# Patient Record
Sex: MALE | Race: WHITE | NOT HISPANIC OR LATINO | Employment: FULL TIME | URBAN - METROPOLITAN AREA
[De-identification: names, ages, dates, MRNs, and addresses within clinical notes are randomized per-mention and may not be internally consistent; named-entity substitution may affect disease eponyms.]

---

## 2018-04-19 ENCOUNTER — HOSPITAL ENCOUNTER (EMERGENCY)
Facility: HOSPITAL | Age: 43
Discharge: HOME | End: 2018-04-19
Attending: EMERGENCY MEDICINE | Admitting: EMERGENCY MEDICINE

## 2018-04-19 VITALS
DIASTOLIC BLOOD PRESSURE: 76 MMHG | RESPIRATION RATE: 16 BRPM | SYSTOLIC BLOOD PRESSURE: 140 MMHG | HEART RATE: 61 BPM | BODY MASS INDEX: 26.73 KG/M2 | TEMPERATURE: 97.6 F | OXYGEN SATURATION: 98 % | WEIGHT: 215 LBS | HEIGHT: 75 IN

## 2018-04-19 DIAGNOSIS — B34.9 VIRAL SYNDROME: Primary | ICD-10-CM

## 2018-04-19 LAB
ALBUMIN SERPL-MCNC: 3.9 G/DL (ref 3.5–5.2)
ALBUMIN/GLOB SERPL: 1.1 G/DL
ALP SERPL-CCNC: 65 U/L (ref 39–117)
ALT SERPL W P-5'-P-CCNC: 14 U/L (ref 1–41)
ANION GAP SERPL CALCULATED.3IONS-SCNC: 10.3 MMOL/L
AST SERPL-CCNC: 16 U/L (ref 1–40)
BASOPHILS # BLD AUTO: 0.11 10*3/MM3 (ref 0–0.2)
BASOPHILS NFR BLD AUTO: 1.3 % (ref 0–1.5)
BILIRUB SERPL-MCNC: 0.4 MG/DL (ref 0.1–1.2)
BUN BLD-MCNC: 13 MG/DL (ref 6–20)
BUN/CREAT SERPL: 13.7 (ref 7–25)
CALCIUM SPEC-SCNC: 8.5 MG/DL (ref 8.6–10.5)
CHLORIDE SERPL-SCNC: 100 MMOL/L (ref 98–107)
CO2 SERPL-SCNC: 25.7 MMOL/L (ref 22–29)
CREAT BLD-MCNC: 0.95 MG/DL (ref 0.76–1.27)
DEPRECATED RDW RBC AUTO: 44 FL (ref 37–54)
EOSINOPHIL # BLD AUTO: 0.12 10*3/MM3 (ref 0–0.7)
EOSINOPHIL NFR BLD AUTO: 1.5 % (ref 0.3–6.2)
ERYTHROCYTE [DISTWIDTH] IN BLOOD BY AUTOMATED COUNT: 13 % (ref 11.5–14.5)
GFR SERPL CREATININE-BSD FRML MDRD: 87 ML/MIN/1.73
GLOBULIN UR ELPH-MCNC: 3.4 GM/DL
GLUCOSE BLD-MCNC: 95 MG/DL (ref 65–99)
HCT VFR BLD AUTO: 47.3 % (ref 40.4–52.2)
HETEROPH AB SER QL LA: NEGATIVE
HGB BLD-MCNC: 16 G/DL (ref 13.7–17.6)
IMM GRANULOCYTES # BLD: 0.04 10*3/MM3 (ref 0–0.03)
IMM GRANULOCYTES NFR BLD: 0.5 % (ref 0–0.5)
LYMPHOCYTES # BLD AUTO: 2 10*3/MM3 (ref 0.9–4.8)
LYMPHOCYTES NFR BLD AUTO: 24.3 % (ref 19.6–45.3)
MCH RBC QN AUTO: 31.6 PG (ref 27–32.7)
MCHC RBC AUTO-ENTMCNC: 33.8 G/DL (ref 32.6–36.4)
MCV RBC AUTO: 93.3 FL (ref 79.8–96.2)
MONOCYTES # BLD AUTO: 1.46 10*3/MM3 (ref 0.2–1.2)
MONOCYTES NFR BLD AUTO: 17.7 % (ref 5–12)
NEUTROPHILS # BLD AUTO: 4.51 10*3/MM3 (ref 1.9–8.1)
NEUTROPHILS NFR BLD AUTO: 54.7 % (ref 42.7–76)
PLATELET # BLD AUTO: 187 10*3/MM3 (ref 140–500)
PMV BLD AUTO: 9.9 FL (ref 6–12)
POTASSIUM BLD-SCNC: 4.1 MMOL/L (ref 3.5–5.2)
PROT SERPL-MCNC: 7.3 G/DL (ref 6–8.5)
RBC # BLD AUTO: 5.07 10*6/MM3 (ref 4.6–6)
S PYO AG THROAT QL: NEGATIVE
SODIUM BLD-SCNC: 136 MMOL/L (ref 136–145)
WBC NRBC COR # BLD: 8.24 10*3/MM3 (ref 4.5–10.7)

## 2018-04-19 PROCEDURE — 80053 COMPREHEN METABOLIC PANEL: CPT | Performed by: PHYSICIAN ASSISTANT

## 2018-04-19 PROCEDURE — 99283 EMERGENCY DEPT VISIT LOW MDM: CPT

## 2018-04-19 PROCEDURE — 87081 CULTURE SCREEN ONLY: CPT | Performed by: PHYSICIAN ASSISTANT

## 2018-04-19 PROCEDURE — 86308 HETEROPHILE ANTIBODY SCREEN: CPT | Performed by: PHYSICIAN ASSISTANT

## 2018-04-19 PROCEDURE — 85025 COMPLETE CBC W/AUTO DIFF WBC: CPT | Performed by: PHYSICIAN ASSISTANT

## 2018-04-19 PROCEDURE — 87147 CULTURE TYPE IMMUNOLOGIC: CPT | Performed by: PHYSICIAN ASSISTANT

## 2018-04-19 PROCEDURE — 87880 STREP A ASSAY W/OPTIC: CPT | Performed by: PHYSICIAN ASSISTANT

## 2018-04-19 NOTE — ED PROVIDER NOTES
MD ATTESTATION NOTE    The JUS and I have discussed this patient's history, physical exam, and treatment plan.  I have reviewed the documentation and personally had a face to face interaction with the patient. I affirm the documentation and agree with the treatment and plan.  The attached note describes my personal findings.    Pt presents to the ED c/o nausea and vomiting onset about 3-4 days ago. Pt has also had subjective fever, chills, sore throat, and generalized myalgias. Pt denies diarrhea, chest pain, dyspnea, headache, neck pain/stiffness, cough, and abdominal pain. On physical exam, pt is alert and oriented x3. Pt is nontoxic in appearance. No scleral icterus. There is no oropharyngeal edema or erythema. There is no LUQ or RUQ abdominal tenderness.  Labs normal. Will discharge home.           Documentation assistance provided by Tom Austin. Information recorded by the scribe was done at my direction and has been verified and validated by me.     Entered by Tom Austin, acting as scribe for Dr. Markos MD.        Tom Austin  04/19/18 4122       Karel Burrows MD  04/19/18 0629

## 2018-04-19 NOTE — ED PROVIDER NOTES
EMERGENCY DEPARTMENT ENCOUNTER    CHIEF COMPLAINT  Chief Complaint: Fever  History given by: Pt  History limited by: Nothing  Room Number: 13/13  PMD: No Known Provider      HPI:  Pt is a 42 y.o. male who presents complaining of fever (101) that began two days ago.  Pt reports associated vomiting and  sore throat.  Pt denies diarrhea and abdominal pain. He also denies contact with any known sick individuals.  Pt took Theraflu at home at 0400. He is concerned that he may have Hepatitis A.     Duration:  Two days  Onset: Gradual  Timing: Constant  Quality: 101  Intensity/Severity: Moderate  Progression: Unchanged  Associated Symptoms: Pt reports associated vomiting and  sore throat.   Aggravating Factors: None  Alleviating Factors: None  Previous Episodes: None  Treatment before arrival: Theraflu    PAST MEDICAL HISTORY  Active Ambulatory Problems     Diagnosis Date Noted   • No Active Ambulatory Problems     Resolved Ambulatory Problems     Diagnosis Date Noted   • No Resolved Ambulatory Problems     No Additional Past Medical History       PAST SURGICAL HISTORY  History reviewed. No pertinent surgical history.    FAMILY HISTORY  History reviewed. No pertinent family history.    SOCIAL HISTORY  Social History     Social History   • Marital status:      Spouse name: N/A   • Number of children: N/A   • Years of education: N/A     Occupational History   • Not on file.     Social History Main Topics   • Smoking status: Current Every Day Smoker   • Smokeless tobacco: Not on file   • Alcohol use Yes      Comment: rarely   • Drug use: Unknown   • Sexual activity: Not on file     Other Topics Concern   • Not on file     Social History Narrative   • No narrative on file       ALLERGIES  Review of patient's allergies indicates no known allergies.    REVIEW OF SYSTEMS  Review of Systems   Constitutional: Positive for fever (101). Negative for activity change and appetite change.   HENT: Positive for sore throat.  Negative for congestion.    Respiratory: Negative for cough and shortness of breath.    Cardiovascular: Negative for chest pain and leg swelling.   Gastrointestinal: Positive for vomiting. Negative for abdominal pain and diarrhea.   Genitourinary: Negative for decreased urine volume and dysuria.   Musculoskeletal: Negative for neck pain.   Skin: Negative for rash and wound.   Neurological: Negative for weakness, numbness and headaches.   Psychiatric/Behavioral: Negative.    All other systems reviewed and are negative.      PHYSICAL EXAM  ED Triage Vitals   Temp Heart Rate Resp BP SpO2   04/19/18 1006 04/19/18 1006 04/19/18 1006 04/19/18 1013 04/19/18 1006   97.6 °F (36.4 °C) 79 18 134/78 97 %      Temp src Heart Rate Source Patient Position BP Location FiO2 (%)   04/19/18 1016 -- -- -- --   Oral           Physical Exam   Constitutional: He is oriented to person, place, and time and well-developed, well-nourished, and in no distress.   HENT:   Head: Normocephalic and atraumatic.   Eyes: EOM are normal. Pupils are equal, round, and reactive to light. No scleral icterus.   Neck: Normal range of motion. Neck supple.   Cardiovascular: Normal rate, regular rhythm and normal heart sounds.    Pulmonary/Chest: Effort normal and breath sounds normal. No respiratory distress.   Abdominal: Soft. There is no tenderness. There is no rebound and no guarding.   Musculoskeletal: Normal range of motion. He exhibits no edema.   Neurological: He is alert and oriented to person, place, and time. He has normal sensation and normal strength.   Skin: Skin is warm and dry.   Psychiatric: Mood and affect normal.   Nursing note and vitals reviewed.      LAB RESULTS  Lab Results (last 24 hours)     Procedure Component Value Units Date/Time    CBC & Differential [[id number]] Collected:  04/19/18 1116    Specimen:  Blood Updated:  04/19/18 1128    Narrative:       The following orders were created for panel order CBC & Differential.  Procedure                                Abnormality         Status                     ---------                               -----------         ------                     CBC Auto Differential[[id number]]        Abnormal            Final result                 Please view results for these tests on the individual orders.    Comprehensive Metabolic Panel [[id number]]  (Abnormal) Collected:  04/19/18 1116    Specimen:  Blood Updated:  04/19/18 1150     Glucose 95 mg/dL      BUN 13 mg/dL      Creatinine 0.95 mg/dL      Sodium 136 mmol/L      Potassium 4.1 mmol/L      Chloride 100 mmol/L      CO2 25.7 mmol/L      Calcium 8.5 (L) mg/dL      Total Protein 7.3 g/dL      Albumin 3.90 g/dL      ALT (SGPT) 14 U/L      AST (SGOT) 16 U/L      Alkaline Phosphatase 65 U/L      Total Bilirubin 0.4 mg/dL      eGFR Non African Amer 87 mL/min/1.73      Globulin 3.4 gm/dL      A/G Ratio 1.1 g/dL      BUN/Creatinine Ratio 13.7     Anion Gap 10.3 mmol/L     Mononucleosis Screen [[id number]]  (Normal) Collected:  04/19/18 1116    Specimen:  Blood Updated:  04/19/18 1146     Monospot Negative    CBC Auto Differential [[id number]]  (Abnormal) Collected:  04/19/18 1116    Specimen:  Blood Updated:  04/19/18 1128     WBC 8.24 10*3/mm3      RBC 5.07 10*6/mm3      Hemoglobin 16.0 g/dL      Hematocrit 47.3 %      MCV 93.3 fL      MCH 31.6 pg      MCHC 33.8 g/dL      RDW 13.0 %      RDW-SD 44.0 fl      MPV 9.9 fL      Platelets 187 10*3/mm3      Neutrophil % 54.7 %      Lymphocyte % 24.3 %      Monocyte % 17.7 (H) %      Eosinophil % 1.5 %      Basophil % 1.3 %      Immature Grans % 0.5 %      Neutrophils, Absolute 4.51 10*3/mm3      Lymphocytes, Absolute 2.00 10*3/mm3      Monocytes, Absolute 1.46 (H) 10*3/mm3      Eosinophils, Absolute 0.12 10*3/mm3      Basophils, Absolute 0.11 10*3/mm3      Immature Grans, Absolute 0.04 (H) 10*3/mm3     Rapid Strep A Screen - Swab, Throat [[id number]]  (Normal) Collected:  04/19/18 1117    Specimen:  Swab from Throat  Updated:  04/19/18 1131     Strep A Ag Negative    Beta Strep Culture, Throat - Swab, Throat [[id number]] Collected:  04/19/18 1117    Specimen:  Swab from Throat Updated:  04/19/18 1130          I ordered the above labs and reviewed the results    PROCEDURES  Procedures      PROGRESS AND CONSULTS  ED Course   Medications - No data to display    1031  Discussed Pt history and care plan with Dr. Burrows.  He agrees with plan of care.     1205  Labs are normal.  Discussed these results with Pt. Discussed plan to discharge.  Pt understands and agrees with the plan, all questions answered.      MEDICAL DECISION MAKING  Results were reviewed/discussed with the patient and they were also made aware of online access. Pt also made aware that some labs, such as cultures, will not be resulted during ER visit and follow up with PMD is necessary.     MDM  Number of Diagnoses or Management Options  Viral syndrome:   Diagnosis management comments: Nontoxic appearing.  No evidence of sepsis.         Amount and/or Complexity of Data Reviewed  Clinical lab tests: reviewed (Labs show nothing acute. )    Patient Progress  Patient progress: stable         DIAGNOSIS  Final diagnoses:   Viral syndrome       DISPOSITION  DISCHARGE    Patient discharged in stable condition.    Reviewed implications of results, diagnosis, meds, responsibility to follow up, warning signs and symptoms of possible worsening, potential complications and reasons to return to ER.    Patient/Family voiced understanding of above instructions.    Discussed plan for discharge, as there is no emergent indication for admission. Patient referred to primary care provider for BP management due to today's BP. Pt/family is agreeable and understands need for follow up and repeat testing.  Pt is aware that discharge does not mean that nothing is wrong but it indicates no emergency is present that requires admission and they must continue care with follow-up as given below or  physician of their choice.     FOLLOW-UP  PATIENT LIAISON [facility] [id number]  [phone]  Schedule an appointment as soon as possible for a visit   As needed         Medication List      No changes were made to your prescriptions during this visit.           Latest Documented Vital Signs:  As of 12:08 PM  BP- 120/73 HR- 69 Temp- 97.6 °F (36.4 °C) (Oral) O2 sat- 97%    --  Documentation assistance provided by michele Bailey for GAIL Byrd.  Information recorded by the scribe was done at my direction and has been verified and validated by me.        Marilee Bailey  04/19/18 1210       FRANCINE Cotton  04/19/18 1612

## 2018-04-19 NOTE — ED TRIAGE NOTES
"Pt complains of fever x 48 hrs (max 101), cold chills, sore throat, body aches  \" wanna make sure I don't have that hepatitis A that's going around\"  "

## 2018-04-21 LAB
BACTERIA SPEC AEROBE CULT: ABNORMAL
BACTERIA SPEC AEROBE CULT: ABNORMAL
STREP GROUPING: ABNORMAL

## 2020-07-13 ENCOUNTER — OFFICE VISIT (OUTPATIENT)
Dept: FAMILY MEDICINE CLINIC | Facility: CLINIC | Age: 45
End: 2020-07-13

## 2020-07-13 VITALS
HEIGHT: 75 IN | RESPIRATION RATE: 18 BRPM | OXYGEN SATURATION: 98 % | SYSTOLIC BLOOD PRESSURE: 154 MMHG | TEMPERATURE: 97.9 F | WEIGHT: 204.6 LBS | BODY MASS INDEX: 25.44 KG/M2 | HEART RATE: 74 BPM | DIASTOLIC BLOOD PRESSURE: 91 MMHG

## 2020-07-13 DIAGNOSIS — S89.91XA INJURY OF RIGHT LOWER EXTREMITY, INITIAL ENCOUNTER: Primary | ICD-10-CM

## 2020-07-13 PROCEDURE — 99213 OFFICE O/P EST LOW 20 MIN: CPT | Performed by: NURSE PRACTITIONER

## 2020-07-13 RX ORDER — NAPROXEN 500 MG/1
500 TABLET ORAL 2 TIMES DAILY WITH MEALS
Qty: 14 TABLET | Refills: 0 | Status: SHIPPED | OUTPATIENT
Start: 2020-07-13 | End: 2020-07-24

## 2020-07-13 NOTE — PROGRESS NOTES
Chief Complaint   Patient presents with   • Leg Pain     fELL THROUGH ROOF 2.5-3 WEEKS AGO & NOW IS HAVING MUSCLE WEAKNESS AFTER WALKING ABOUT 50 FT. ALSO, GETS BURNING AND TINGLING IN RIGHT FOOT.         Subjective   Scott Connors is a 44 y.o.  male who presents today for   HPI  Scott Connors  has no past medical history on file.    Allergies   Allergen Reactions   • Latex Hives       Current Outpatient Medications:   •  naproxen (Naprosyn) 500 MG tablet, Take 1 tablet by mouth 2 (Two) Times a Day With Meals., Disp: 14 tablet, Rfl: 0  History reviewed. No pertinent past medical history.  History reviewed. No pertinent surgical history.  Social History     Socioeconomic History   • Marital status:      Spouse name: Not on file   • Number of children: Not on file   • Years of education: Not on file   • Highest education level: Not on file   Tobacco Use   • Smoking status: Current Every Day Smoker     Packs/day: 0.75     Types: Cigarettes     Start date: 1990   • Smokeless tobacco: Never Used   Substance and Sexual Activity   • Alcohol use: Yes     Comment: rarely   • Drug use: Never   • Sexual activity: Defer     Family History   Problem Relation Age of Onset   • Stroke Father    • Aneurysm Father    • Clotting disorder Father    • Hypertension Father    • Alcohol abuse Father      PHQ-2 Depression Screening  Little interest or pleasure in doing things? 1   Feeling down, depressed, or hopeless? 0   PHQ-2 Total Score 1     PHQ-9 Depression Screening  Little interest or pleasure in doing things? 1   Feeling down, depressed, or hopeless? 0   Trouble falling or staying asleep, or sleeping too much?     Feeling tired or having little energy?     Poor appetite or overeating?     Feeling bad about yourself - or that you are a failure or have let yourself or your family down?     Trouble concentrating on things, such as reading the newspaper or watching television?     Moving or speaking so slowly that other  "people could have noticed? Or the opposite - being so fidgety or restless that you have been moving around a lot more than usual?     Thoughts that you would be better off dead, or of hurting yourself in some way?     PHQ-9 Total Score 1   If you checked off any problems, how difficult have these problems made it for you to do your work, take care of things at home, or get along with other people?         Family history, surgical history, past medical history, Allergies and med's reviewed with patient today and updated in [facility] EMR.     ROS:  Review of Systems    OBJECTIVE:  Vitals:    07/13/20 1539   BP: 154/91   BP Location: Left arm   Patient Position: Sitting   Cuff Size: Adult   Pulse: 74   Resp: 18   Temp: 97.9 °F (36.6 °C)   TempSrc: Oral   SpO2: 98%   Weight: 92.8 kg (204 lb 9.6 oz)   Height: 190.5 cm (75\")     Body mass index is 25.57 kg/m².  Physical Exam    ASSESSMENT/ PLAN:    Scott was seen today for leg pain.    Diagnoses and all orders for this visit:    Injury of right lower extremity, initial encounter  -     XR Femur 2 View Right (In Office)  -     naproxen (Naprosyn) 500 MG tablet; Take 1 tablet by mouth 2 (Two) Times a Day With Meals.  -     XR Tibia Fibula 2 View Right (In Office)        Orders Placed Today:     New Medications Ordered This Visit   Medications   • naproxen (Naprosyn) 500 MG tablet     Sig: Take 1 tablet by mouth 2 (Two) Times a Day With Meals.     Dispense:  14 tablet     Refill:  0        Management Plan:     An After Visit Summary was printed and given to the patient at discharge.    Follow-up: No follow-ups on file.    Marysol Tariq, APRN 7/13/2020 15:49  This note was electronically signed.      "

## 2020-07-13 NOTE — PROGRESS NOTES
Chief Complaint   Patient presents with   • Leg Pain     fELL THROUGH ROOF 2.5-3 WEEKS AGO & NOW IS HAVING MUSCLE WEAKNESS AFTER WALKING ABOUT 50 FT. ALSO, GETS BURNING AND TINGLING IN RIGHT FOOT.         Subjective   Scott Connors is a 44 y.o.  male who presents today for   • Leg Pain    Pt feel through a roof 2-3 weeks ago and has been having significant pain in his leg since this time. Pt report that is has improved slightly but it still hurting no bruising at this time. No swelling or or bruising noted at this time.     Scott Connors  has no past medical history on file.    Allergies   Allergen Reactions   • Latex Hives       Current Outpatient Medications:   •  ibuprofen (ADVIL,MOTRIN) 800 MG tablet, Take 1 tablet by mouth Every 8 (Eight) Hours As Needed for Mild Pain  or Moderate Pain  for up to 14 days., Disp: 42 tablet, Rfl: 0  •  predniSONE (DELTASONE) 20 MG tablet, Take 2 tablets by mouth Daily for 5 days., Disp: 10 tablet, Rfl: 0  History reviewed. No pertinent past medical history.  History reviewed. No pertinent surgical history.  Social History     Socioeconomic History   • Marital status:      Spouse name: Not on file   • Number of children: Not on file   • Years of education: Not on file   • Highest education level: Not on file   Tobacco Use   • Smoking status: Current Every Day Smoker     Packs/day: 0.75     Types: Cigarettes     Start date: 1990   • Smokeless tobacco: Never Used   Substance and Sexual Activity   • Alcohol use: Yes     Comment: rarely   • Drug use: Never   • Sexual activity: Defer     Family History   Problem Relation Age of Onset   • Stroke Father    • Aneurysm Father    • Clotting disorder Father    • Hypertension Father    • Alcohol abuse Father      PHQ-2 Depression Screening  Little interest or pleasure in doing things? 1   Feeling down, depressed, or hopeless? 0   PHQ-2 Total Score 1     PHQ-9 Depression Screening  Little interest or pleasure in doing things?  "1   Feeling down, depressed, or hopeless? 0   Trouble falling or staying asleep, or sleeping too much?     Feeling tired or having little energy?     Poor appetite or overeating?     Feeling bad about yourself - or that you are a failure or have let yourself or your family down?     Trouble concentrating on things, such as reading the newspaper or watching television?     Moving or speaking so slowly that other people could have noticed? Or the opposite - being so fidgety or restless that you have been moving around a lot more than usual?     Thoughts that you would be better off dead, or of hurting yourself in some way?     PHQ-9 Total Score 1   If you checked off any problems, how difficult have these problems made it for you to do your work, take care of things at home, or get along with other people?         Family history, surgical history, past medical history, Allergies and med's reviewed with patient today and updated in [facility] EMR.     ROS:  Review of Systems   All other systems reviewed and are negative.      OBJECTIVE:  Vitals:    07/13/20 1539   BP: 154/91   BP Location: Left arm   Patient Position: Sitting   Cuff Size: Adult   Pulse: 74   Resp: 18   Temp: 97.9 °F (36.6 °C)   TempSrc: Oral   SpO2: 98%   Weight: 92.8 kg (204 lb 9.6 oz)   Height: 190.5 cm (75\")     Body mass index is 25.57 kg/m².  Physical Exam   Constitutional: He is oriented to person, place, and time. He appears well-developed and well-nourished.   HENT:   Head: Normocephalic.   Eyes: Pupils are equal, round, and reactive to light.   Neck: Normal range of motion. Neck supple.   Cardiovascular: Normal rate and regular rhythm.   Pulmonary/Chest: Effort normal and breath sounds normal.   Abdominal: Soft. Bowel sounds are normal.   Musculoskeletal: Normal range of motion.   Neurological: He is alert and oriented to person, place, and time.   Skin: Skin is warm and dry.   Psychiatric: He has a normal mood and affect. His behavior is normal. " Judgment and thought content normal.   Nursing note and vitals reviewed.      ASSESSMENT/ PLAN:    Scott was seen today for leg pain.    Diagnoses and all orders for this visit:    Injury of right lower extremity, initial encounter  -     XR Femur 2 View Right (In Office)  -     Discontinue: naproxen (Naprosyn) 500 MG tablet; Take 1 tablet by mouth 2 (Two) Times a Day With Meals.  -     Cancel: XR Tibia Fibula 2 View Right (In Office)  -     XR Tibia Fibula 2 View Right; Future        Orders Placed Today:     No orders of the defined types were placed in this encounter.       Management Plan:     An After Visit Summary was printed and given to the patient at discharge.    Follow-up: Return in about 2 weeks (around 7/27/2020).    Marysol Tariq, SHEILA 7/27/2020 11:17  This note was electronically signed.

## 2020-07-16 ENCOUNTER — TELEPHONE (OUTPATIENT)
Dept: FAMILY MEDICINE CLINIC | Facility: CLINIC | Age: 45
End: 2020-07-16

## 2020-07-17 ENCOUNTER — TELEPHONE (OUTPATIENT)
Dept: FAMILY MEDICINE CLINIC | Facility: CLINIC | Age: 45
End: 2020-07-17

## 2020-07-24 ENCOUNTER — OFFICE VISIT (OUTPATIENT)
Dept: FAMILY MEDICINE CLINIC | Facility: CLINIC | Age: 45
End: 2020-07-24

## 2020-07-24 VITALS
SYSTOLIC BLOOD PRESSURE: 142 MMHG | RESPIRATION RATE: 16 BRPM | BODY MASS INDEX: 26.11 KG/M2 | HEIGHT: 75 IN | WEIGHT: 210 LBS | DIASTOLIC BLOOD PRESSURE: 91 MMHG | TEMPERATURE: 99.5 F | HEART RATE: 67 BPM | OXYGEN SATURATION: 98 %

## 2020-07-24 DIAGNOSIS — S89.91XA INJURY OF RIGHT LOWER EXTREMITY, INITIAL ENCOUNTER: Primary | ICD-10-CM

## 2020-07-24 PROCEDURE — 99213 OFFICE O/P EST LOW 20 MIN: CPT | Performed by: NURSE PRACTITIONER

## 2020-07-24 RX ORDER — IBUPROFEN 800 MG/1
800 TABLET ORAL EVERY 8 HOURS PRN
Qty: 42 TABLET | Refills: 0 | Status: SHIPPED | OUTPATIENT
Start: 2020-07-24 | End: 2020-08-07

## 2020-07-24 RX ORDER — PREDNISONE 20 MG/1
40 TABLET ORAL DAILY
Qty: 10 TABLET | Refills: 0 | Status: SHIPPED | OUTPATIENT
Start: 2020-07-24 | End: 2020-07-29

## 2020-07-31 ENCOUNTER — TELEPHONE (OUTPATIENT)
Dept: FAMILY MEDICINE CLINIC | Facility: CLINIC | Age: 45
End: 2020-07-31

## 2020-08-01 ENCOUNTER — HOSPITAL ENCOUNTER (OUTPATIENT)
Dept: MRI IMAGING | Facility: HOSPITAL | Age: 45
Discharge: HOME | End: 2020-08-01
Admitting: NURSE PRACTITIONER

## 2020-08-01 DIAGNOSIS — S89.91XA INJURY OF RIGHT LOWER EXTREMITY, INITIAL ENCOUNTER: ICD-10-CM

## 2020-08-01 PROCEDURE — 73718 MRI LOWER EXTREMITY W/O DYE: CPT

## 2020-08-03 ENCOUNTER — TELEPHONE (OUTPATIENT)
Dept: FAMILY MEDICINE CLINIC | Facility: CLINIC | Age: 45
End: 2020-08-03

## 2020-08-03 DIAGNOSIS — S89.91XA INJURY OF RIGHT LOWER EXTREMITY, INITIAL ENCOUNTER: Primary | ICD-10-CM

## 2020-08-03 NOTE — TELEPHONE ENCOUNTER
----- Message from SHEILA Linder sent at 8/2/2020  3:58 PM EDT -----  IMPRESSION:  1.Mild edema within the muscular soft tissues of the posterior compartment of the distal right lower leg. These findings are nonspecific and may be related to residual changes from muscle injury or strain. Residual edema from trauma of the lower leg   could also this appearance. No abnormal fluid collections are seen. There is no large hemorrhage or hematoma. Findings secondary to developing changes of denervation could potentially have this appearance. Correlation with EMG may be considered.     Recommend seeing Orthopedic. Ask how he is doing  Thank you  Marysol  ----- Message -----  From: Interface, Rad Results [place] In  Sent: 8/1/2020  10:22 AM EDT  To: SHEILA Linder

## 2020-08-03 NOTE — TELEPHONE ENCOUNTER
Called patient. Patient's identity verified. Advised him of MRI results of provider's recommendation to see Ortho. He is in agreement. However, he is requesting something for the pain until he can get into Ortho.

## 2020-08-04 ENCOUNTER — OFFICE VISIT (OUTPATIENT)
Dept: ORTHOPEDIC SURGERY | Facility: CLINIC | Age: 45
End: 2020-08-04

## 2020-08-04 VITALS
BODY MASS INDEX: 25.99 KG/M2 | SYSTOLIC BLOOD PRESSURE: 146 MMHG | HEART RATE: 96 BPM | DIASTOLIC BLOOD PRESSURE: 80 MMHG | WEIGHT: 209 LBS | HEIGHT: 75 IN

## 2020-08-04 DIAGNOSIS — S89.91XA INJURY OF RIGHT LOWER EXTREMITY, INITIAL ENCOUNTER: Primary | ICD-10-CM

## 2020-08-04 DIAGNOSIS — R20.2 NUMBNESS AND TINGLING OF RIGHT LEG: Primary | ICD-10-CM

## 2020-08-04 DIAGNOSIS — R20.0 NUMBNESS AND TINGLING OF RIGHT LEG: Primary | ICD-10-CM

## 2020-08-04 PROCEDURE — 99203 OFFICE O/P NEW LOW 30 MIN: CPT | Performed by: FAMILY MEDICINE

## 2020-08-04 RX ORDER — HYDROCODONE BITARTRATE AND ACETAMINOPHEN 5; 325 MG/1; MG/1
1 TABLET ORAL EVERY 8 HOURS PRN
Qty: 15 TABLET | Refills: 0 | Status: SHIPPED | OUTPATIENT
Start: 2020-08-04 | End: 2020-08-09

## 2020-08-04 NOTE — PROGRESS NOTES
"Primary Care Sports Medicine Office Visit Note     Patient ID: Scott Connors is a 44 y.o. male.    Chief Complaint:  Chief Complaint   Patient presents with   • Right Thigh - Initial Evaluation     PT fell through a roof 6/12/20, pain radiated down leg, MRI @ [facility]     HPI:    Mr. Scott Connors is a 44 y.o. male who presents to the clinic today for  RLE pain. He states on the 12th of June (about 2 months ago) he fell through the roof of his home. Entire leg was \"squeezed into a 5 inch hole\". He had a moderate amount of deep bruising to the thigh. Slowly over a few weeks colors changed and bruise reabsorbed. Since then he continues to have some deep achy pain of the medial and anterior distal thigh, and proximal calf. He has worsening of pain with any and all activity. Started to notice a few weeks after injury, burning to the second and third digits. Now this sensation has changed to complete numbness. Happens with activity and improves with rest. Has attempted naproxen, steroid pack, IBU.     No past medical history on file.    No past surgical history on file.    Family History   Problem Relation Age of Onset   • Stroke Father    • Aneurysm Father    • Clotting disorder Father    • Hypertension Father    • Alcohol abuse Father      Social History     Occupational History   • Not on file   Tobacco Use   • Smoking status: Current Every Day Smoker     Packs/day: 0.75     Types: Cigarettes     Start date: 1990   • Smokeless tobacco: Never Used   Substance and Sexual Activity   • Alcohol use: Yes     Comment: rarely   • Drug use: Never   • Sexual activity: Defer      Review of Systems   Constitutional: Negative for activity change and fever.   Respiratory: Negative for cough and shortness of breath.    Cardiovascular: Negative for chest pain.   Gastrointestinal: Negative for constipation, diarrhea, nausea and vomiting.   Musculoskeletal: Positive for arthralgias.   Skin: Negative for color change and rash. " "  Neurological: Negative for weakness.   Hematological: Does not bruise/bleed easily.     Objective:    /80   Pulse 96   Ht 190.5 cm (75\")   Wt 94.8 kg (209 lb)   BMI 26.12 kg/m²     Physical Examination:  Physical Exam   Constitutional: He appears well-developed and well-nourished. No distress.   HENT:   Head: Normocephalic and atraumatic.   Eyes: Conjunctivae are normal.   Cardiovascular: Intact distal pulses.   Pulmonary/Chest: Effort normal. No respiratory distress.   Neurological: He is alert.   Skin: Skin is warm. Capillary refill takes less than 2 seconds. He is not diaphoretic.   Nursing note and vitals reviewed.    Right Knee Exam     Range of Motion   The patient has normal right knee ROM.      Right Hip Exam     Tenderness   The patient is experiencing no tenderness.     Range of Motion   The patient has normal right hip ROM.    Muscle Strength   The patient has normal right hip strength.    Tests   EVA: negative  Fadir:  Negative FADIR test    Other   Erythema: absent  Sensation: normal  Pulse: present    Comments:  Neg log roll, neg stenchfield, neg scour.      Back Exam     Tenderness   The patient is experiencing no tenderness.     Range of Motion   The patient has normal back ROM.    Muscle Strength   The patient has normal back strength.    Tests   Straight leg raise right: negative    Reflexes   Patellar: normal    Other   Sensation: decreased  Erythema: no back redness    Comments:  Decreased sensation to light touch of 2nd and 3rd digits, RLE        Imaging and other tests:  MRI Tibia/Fibula dated 8/1/2020 reviewed, yields the following IMPRESSION:  1.Mild edema within the muscular soft tissues of the posterior compartment of the distal right lower leg. These findings are nonspecific and may be related to residual changes from muscle injury or strain. Residual edema from trauma of the lower leg   could also this appearance. No abnormal fluid collections are seen. There is no large " "hemorrhage or hematoma. Findings secondary to developing changes of denervation could potentially have this appearance. Correlation with EMG may be considered.    Assessment and Plan:    1. Numbness and tingling of right leg  - EMG 1 Limb; Future    After discussing pathology and all treatment options with the patient, I would like to proceed with further evaluation of his neuropathy with EMG/nerve conduction study.  RTC in 1-2 weeks status post study for results discussion, and further treatment as necessary.    Karlo LOZADA \"Chance\" René HERNANDEZ, , CAQSM  08/04/20  16:29    Disclaimer: Please note that areas of this note were completed with computer voice recognition software.  Quite often unanticipated grammatical, syntax, homophones, and other interpretive errors are inadvertently transcribed by the computer software. Please excuse any errors that have escaped final proofreading.  "

## 2020-08-04 NOTE — TELEPHONE ENCOUNTER
INSPECT RAN, AND PATIENT WAS FOUND, BUT NO REPORTABLE CONTROLLED SUBSTANCES FILLED WITHIN AT LEAST THE LAST YEAR.

## 2020-08-06 ENCOUNTER — PROCEDURE VISIT (OUTPATIENT)
Dept: NEUROLOGY | Facility: CLINIC | Age: 45
End: 2020-08-06

## 2020-08-06 VITALS — TEMPERATURE: 98.9 F

## 2020-08-06 DIAGNOSIS — R20.2 NUMBNESS AND TINGLING OF RIGHT LEG: ICD-10-CM

## 2020-08-06 DIAGNOSIS — R20.0 NUMBNESS AND TINGLING OF RIGHT LEG: ICD-10-CM

## 2020-08-06 PROCEDURE — 95886 MUSC TEST DONE W/N TEST COMP: CPT | Performed by: PSYCHIATRY & NEUROLOGY

## 2020-08-06 PROCEDURE — 95908 NRV CNDJ TST 3-4 STUDIES: CPT | Performed by: PSYCHIATRY & NEUROLOGY

## 2020-08-06 NOTE — PROGRESS NOTES
EMG and Nerve Conduction Studies    The complete report includes the data sheets.     Referring Doctor: Karlo Merritt II, DO    History: RLE/Numbness and tingling of right leg    Results:    1.  The right sural sensory NCS was normal.    2.  The right peroneal and tibial motor NCS was normal.    3.  The tibial F wave latency was normal.  The Peroneal F wave latency is prolonged.    4. There were mild neurogenic change in the the L5 myotome.     Impression:    This is a mildly abnormal study due to mild neurogenic change in the right L5 myotome.  Correlation with MRI of the lumbar spine may be indicated.      Joseph Seipel, M.D.

## 2020-08-07 ENCOUNTER — TELEPHONE (OUTPATIENT)
Dept: NEUROLOGY | Facility: CLINIC | Age: 45
End: 2020-08-07

## 2020-08-07 NOTE — TELEPHONE ENCOUNTER
PT HAD THE EMG DONE ON HIS LEGS YESTERDAY AND DR. SEIPEL TOLD HIM THAT HE NEEDS TO HAVE A MRI OF HIS BACK AND PT IS WONDERING IF HE NEEDS TO SET THAT UP OR WILL DR.SEIPEL OFFICE TO SET IT UP. PLEASE CALL HIM BACK [phone]

## 2020-08-07 NOTE — TELEPHONE ENCOUNTER
Dr. Merritt was referring MD. He would need to be the one to order MRI if he feels also that it is needed. I have called pt and told him this as well.

## 2020-08-12 ENCOUNTER — TELEPHONE (OUTPATIENT)
Dept: ORTHOPEDIC SURGERY | Facility: CLINIC | Age: 45
End: 2020-08-12

## 2020-08-12 DIAGNOSIS — M54.16 LUMBAR RADICULOPATHY: ICD-10-CM

## 2020-08-12 DIAGNOSIS — R20.0 RIGHT LEG NUMBNESS: ICD-10-CM

## 2020-08-12 DIAGNOSIS — R29.898 RIGHT LEG WEAKNESS: Primary | ICD-10-CM

## 2020-08-12 NOTE — TELEPHONE ENCOUNTER
"Called the patient to discuss nerve conduction/EMG results.  Unfortunately this showed what seems to be a centrally origin problem in the L5 myotome.  This test suggest L5 pathology.  After discussing this with the patient, he would like to proceed with MRI of the lumbar spine for further evaluation, after my recommendation.  We will give an order this test, call patient with results.    Karlo LOZADA \"Chance\" René HERNANDEZ DO, CAQSM  08/12/20  11:32      "

## 2020-08-25 ENCOUNTER — HOSPITAL ENCOUNTER (OUTPATIENT)
Dept: MRI IMAGING | Facility: HOSPITAL | Age: 45
Discharge: HOME | End: 2020-08-25
Admitting: FAMILY MEDICINE

## 2020-08-25 DIAGNOSIS — M54.16 LUMBAR RADICULOPATHY: ICD-10-CM

## 2020-08-25 DIAGNOSIS — R29.898 RIGHT LEG WEAKNESS: ICD-10-CM

## 2020-08-25 DIAGNOSIS — R20.0 RIGHT LEG NUMBNESS: ICD-10-CM

## 2020-08-25 PROCEDURE — 72148 MRI LUMBAR SPINE W/O DYE: CPT

## 2020-08-31 ENCOUNTER — OFFICE VISIT (OUTPATIENT)
Dept: ORTHOPEDIC SURGERY | Facility: CLINIC | Age: 45
End: 2020-08-31

## 2020-08-31 VITALS
SYSTOLIC BLOOD PRESSURE: 124 MMHG | DIASTOLIC BLOOD PRESSURE: 79 MMHG | WEIGHT: 211 LBS | HEIGHT: 75 IN | BODY MASS INDEX: 26.24 KG/M2 | HEART RATE: 73 BPM

## 2020-08-31 DIAGNOSIS — M48.061 SPINAL STENOSIS OF LUMBAR REGION, UNSPECIFIED WHETHER NEUROGENIC CLAUDICATION PRESENT: ICD-10-CM

## 2020-08-31 DIAGNOSIS — M48.061 FORAMINAL STENOSIS OF LUMBAR REGION: Primary | ICD-10-CM

## 2020-08-31 PROCEDURE — 99213 OFFICE O/P EST LOW 20 MIN: CPT | Performed by: FAMILY MEDICINE

## 2020-08-31 NOTE — PROGRESS NOTES
"Primary Care Sports Medicine Office Visit Note     Patient ID: Scott Connors is a 44 y.o. male.    Chief Complaint:  Chief Complaint   Patient presents with   • Right Femur - Follow-up     HPI:    Mr. Scott Connors is a 44 y.o. male who returns to the clinic today for follow-up evaluation and MRI results.  The patient was previously seen after falling through a roof on 6/12/2020.  His leg was trapped in a 5 inch x 5 inch hole, and had a moderate amount of deep bruising to the thigh.  Previous to this injury he does not admit to significant of back pain nor radiation, but since then he has had a mild amount of tingling numbness to the second and third digits of his right foot.  EMG was ordered, which is more consistent with lumbar radiculopathy at L5, than any peripheral lesion.  Therefore MRI lumbar spine was ordered.  The patient states numbness and tingling has not changed/improved/worsened since previous visit.    History reviewed. No pertinent past medical history.    History reviewed. No pertinent surgical history.    Family History   Problem Relation Age of Onset   • Stroke Father    • Aneurysm Father    • Clotting disorder Father    • Hypertension Father    • Alcohol abuse Father      Social History     Occupational History   • Not on file   Tobacco Use   • Smoking status: Current Every Day Smoker     Packs/day: 0.75     Types: Cigarettes     Start date: 1990   • Smokeless tobacco: Never Used   Substance and Sexual Activity   • Alcohol use: Yes     Comment: rarely   • Drug use: Never   • Sexual activity: Defer      Review of Systems   Constitutional: Negative for activity change, fatigue and fever.   Musculoskeletal: Positive for arthralgias and back pain.   Skin: Negative for color change and rash.   Neurological: Positive for numbness.       Objective:    /79 (BP Location: Right arm, Patient Position: Sitting, Cuff Size: Adult)   Pulse 73   Ht 190.5 cm (75\")   Wt 95.7 kg (211 lb)   BMI 26.37 kg/m² "     Physical Examination:  Physical Exam   Constitutional: He appears well-developed and well-nourished. No distress.   HENT:   Head: Normocephalic and atraumatic.   Eyes: Conjunctivae are normal.   Cardiovascular: Intact distal pulses.   Pulmonary/Chest: Effort normal. No respiratory distress.   Neurological: He is alert.   Skin: Skin is warm. Capillary refill takes less than 2 seconds. He is not diaphoretic.   Nursing note and vitals reviewed.    Back Exam     Tenderness   The patient is experiencing no tenderness.     Range of Motion   Extension: normal   Flexion: normal   Lateral bend right: normal   Lateral bend left: normal   Rotation right: normal   Rotation left: normal     Comments:  For further details, please see previous back examination        Imaging and other tests:  MRI LUMBAR SPINE WO CONTRAST-     INDICATIONS:  Low back pain, right lower extremity numbness, history of a fall from a  roof on 6/2020, radiculopathy.     COMPARISON:  No Comparisons Available     TECHNIQUE:   Routine magnetic resonance imaging of the lumbar spine was performed  without the administration of contrast.     There is no marrow edema to indicate an acute bony abnormality. There is  diminished disc height and signal at L4-L5 and L5-S1 indicating  degenerative disc disease. The remaining lumbar discs demonstrate normal  height and signal. There is spurring of the vertebral endplates at L4-L5  and L5-S1 indicating spondylosis. The conus medullaris terminates at the  T12-L1 level and is unremarkable. There is no paraspinal hematoma or  fluid collection. There is a round T2 weighted hyperintense mass in the  posterior cortex of the left kidney consistent with a benign renal cyst.     L5-S1:  The facet joints are intact. There is a broad-based disc  osteophyte complex with a central disc protrusion. The canal is  borderline stenotic measuring 10 mm in AP dimension. There is neural  foraminal narrowing without definite nerve root  "impingement.     L4-L5:  There is facet arthritis, ligamentum flavum thickening, and a  broad-based disc osteophyte complex. The canal is mildly stenotic with  the thecal sac reduced to 9 mm in the AP dimension. There is neural  foraminal narrowing without nerve root impingement.     L3-L4:  There is facet arthritis with ligamentum flavum thickening and a  mild disc osteophyte complex. The canal is borderline stenotic measuring  10 mm in the AP dimension. There is neural foraminal narrowing without  nerve root impingement.     L2-L3:  There is no herniated disc, canal, or neural foraminal stenosis.  The facet joints are intact.     L1-L2:  There is no herniated disc, canal, or neural foraminal stenosis.  The facet joints are intact.     T12-L1:  There is no herniated disc, canal, or neural foraminal  stenosis. The facet joints are intact.      IMPRESSION:    1. Degenerative changes as described.  2. Spinal stenosis and neural foraminal narrowing. Level by level  analysis as discussed above.    Electronically Signed By-Luis Manuel Fleming On:8/25/2020 7:05 PM  This report was finalized on [id number] by  Luis Manuel Fleming, .    Assessment and Plan:    1. Foraminal stenosis of lumbar region    2. Spinal stenosis of lumbar region, unspecified whether neurogenic claudication present    I discussed with the patient today that ultimately already trying anti-inflammatories, muscle extra, and initial steroid burst, I feel he has exhausted most of the conservative measures that I would help him here with.  I would like for him to be evaluated by spine for further treatment options.  Referral placed today.  RTC to me PRN.    Karlo LOZADA \"Chance\" René HERNANDEZ DO, CAQSM  08/31/20  17:03    Disclaimer: Please note that areas of this note were completed with computer voice recognition software.  Quite often unanticipated grammatical, syntax, homophones, and other interpretive errors are inadvertently transcribed by the computer software. Please " excuse any errors that have escaped final proofreading.

## 2021-04-23 ENCOUNTER — OFFICE VISIT (OUTPATIENT)
Dept: FAMILY MEDICINE CLINIC | Facility: CLINIC | Age: 46
End: 2021-04-23

## 2021-04-23 VITALS
BODY MASS INDEX: 25.86 KG/M2 | OXYGEN SATURATION: 96 % | HEART RATE: 62 BPM | TEMPERATURE: 97.1 F | DIASTOLIC BLOOD PRESSURE: 71 MMHG | SYSTOLIC BLOOD PRESSURE: 125 MMHG | WEIGHT: 208 LBS | HEIGHT: 75 IN | RESPIRATION RATE: 18 BRPM

## 2021-04-23 DIAGNOSIS — B07.0 PLANTAR WART OF RIGHT FOOT: Primary | ICD-10-CM

## 2021-04-23 PROCEDURE — 99213 OFFICE O/P EST LOW 20 MIN: CPT | Performed by: NURSE PRACTITIONER

## 2022-09-26 ENCOUNTER — OFFICE VISIT (OUTPATIENT)
Dept: FAMILY MEDICINE CLINIC | Facility: CLINIC | Age: 47
End: 2022-09-26

## 2022-09-26 VITALS
OXYGEN SATURATION: 96 % | HEART RATE: 80 BPM | WEIGHT: 214 LBS | SYSTOLIC BLOOD PRESSURE: 140 MMHG | HEIGHT: 75 IN | DIASTOLIC BLOOD PRESSURE: 80 MMHG | BODY MASS INDEX: 26.61 KG/M2 | RESPIRATION RATE: 18 BRPM | TEMPERATURE: 98.1 F

## 2022-09-26 DIAGNOSIS — Z13.0 SCREENING FOR ENDOCRINE, METABOLIC AND IMMUNITY DISORDER: ICD-10-CM

## 2022-09-26 DIAGNOSIS — Z13.220 ENCOUNTER FOR SCREENING FOR LIPID DISORDER: ICD-10-CM

## 2022-09-26 DIAGNOSIS — Z13.228 SCREENING FOR ENDOCRINE, METABOLIC AND IMMUNITY DISORDER: ICD-10-CM

## 2022-09-26 DIAGNOSIS — Z13.29 SCREENING FOR THYROID DISORDER: ICD-10-CM

## 2022-09-26 DIAGNOSIS — R25.2 BILATERAL LEG CRAMPS: ICD-10-CM

## 2022-09-26 DIAGNOSIS — Z13.1 SCREENING FOR DIABETES MELLITUS: ICD-10-CM

## 2022-09-26 DIAGNOSIS — Z82.3 FAMILY HISTORY OF STROKE (CEREBROVASCULAR): Primary | ICD-10-CM

## 2022-09-26 DIAGNOSIS — Z13.29 SCREENING FOR ENDOCRINE, METABOLIC AND IMMUNITY DISORDER: ICD-10-CM

## 2022-09-26 LAB — HBA1C MFR BLD: 5.5 % (ref 3.5–5.6)

## 2022-09-26 PROCEDURE — 80061 LIPID PANEL: CPT | Performed by: REGISTERED NURSE

## 2022-09-26 PROCEDURE — 83036 HEMOGLOBIN GLYCOSYLATED A1C: CPT | Performed by: REGISTERED NURSE

## 2022-09-26 PROCEDURE — 80050 GENERAL HEALTH PANEL: CPT | Performed by: REGISTERED NURSE

## 2022-09-26 PROCEDURE — 99215 OFFICE O/P EST HI 40 MIN: CPT | Performed by: REGISTERED NURSE

## 2022-09-26 NOTE — PROGRESS NOTES
Chief Complaint  Leg Pain (Bilateral leg pain, patient states it is in his calves, and he can walk for a little bit and it starts hurting but when he sets down for a few minutes it stops hurting but when he starts walking again the pain comes back, he states its in spurts. )    Subjective    History of Present Illness {CC  Problem List  Visit  Diagnosis   Encounters  Notes  Medications  Labs  Result Review Imaging  Media :23}     Scott Connors presents to [facility] for Leg Pain (Bilateral leg pain, patient states it is in his calves, and he can walk for a little bit and it starts hurting but when he sets down for a few minutes it stops hurting but when he starts walking again the pain comes back, he states its in spurts. ).    History of Present Illness  Patient is a 47-year-old male who presents to the clinic today with bilateral calf and bilateral hip pain x2 months.  Patient denies any chest pain, shortness of breath, or any fevers.  Patient denies any known exposure to COVID, flu, or any other contagious illnesses.    In regards to bilateral hip and calf pain, patient shares that bilateral calf pain and bilateral hip occur daily and typically last all day long when moving.  Patient shares that symptoms are alleviated as soon as he rests.  Patient shares that he is not able to walk long distances at a time due to the leg pain.  Patient would like to further investigate this.  I advised patient to continue drinking Gatorade's when he is outside working.  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  He shares that he does not drink liquids well in the evening and does have moments where he feels very thirsty.       Review of Systems   Constitutional: Negative.  Negative for activity change, chills, fatigue and fever.   HENT: Negative.  Negative for congestion, dental problem, ear pain, hearing loss, rhinorrhea, sinus pain, sore  "throat, tinnitus and trouble swallowing.    Eyes: Negative.  Negative for pain and visual disturbance.   Respiratory: Negative.  Negative for cough, chest tightness, shortness of breath and wheezing.    Cardiovascular: Negative.  Negative for chest pain, palpitations and leg swelling.   Gastrointestinal: Negative.  Negative for abdominal pain, diarrhea, nausea and vomiting.   Endocrine: Negative.  Negative for polydipsia, polyphagia and polyuria.   Genitourinary: Negative.  Negative for difficulty urinating, dysuria, frequency and urgency.   Musculoskeletal: Positive for myalgias (Bilateral leg cramping in the calf and hips). Negative for arthralgias and back pain.   Skin: Negative.  Negative for color change, pallor, rash and wound.   Allergic/Immunologic: Negative.  Negative for environmental allergies.   Neurological: Negative.  Negative for dizziness, speech difficulty, weakness, light-headedness, numbness and headaches.   Hematological: Negative.    Psychiatric/Behavioral: Negative.  Negative for confusion, decreased concentration, self-injury and suicidal ideas. The patient is not nervous/anxious.    All other systems reviewed and are negative.       Objective     Vital Signs:   /80 (BP Location: Left arm, Patient Position: Sitting, Cuff Size: Adult)   Pulse 80   Temp 98.1 °F (36.7 °C) (Temporal)   Resp 18   Ht 190.5 cm (75\")   Wt 97.1 kg (214 lb)   SpO2 96%   BMI 26.75 kg/m²   No current outpatient medications on file prior to visit.     No current facility-administered medications on file prior to visit.        History reviewed. No pertinent past medical history.   History reviewed. No pertinent surgical history.   Family History   Problem Relation Age of Onset   • Stroke Father    • Aneurysm Father    • Clotting disorder Father    • Hypertension Father    • Alcohol abuse Father       Social History     Socioeconomic History   • Marital status:    Tobacco Use   • Smoking status: Current " Every Day Smoker     Packs/day: 0.75     Years: 31.00     Pack years: 23.25     Types: Cigarettes     Start date: 1990   • Smokeless tobacco: Never Used   Substance and Sexual Activity   • Alcohol use: Yes     Comment: rarely   • Drug use: Never   • Sexual activity: Defer         No visits with results within 3 Month(s) from this visit.   Latest known visit with results is:   Admission on 04/19/2018, Discharged on 04/19/2018   Component Date Value Ref Range Status   • Glucose 04/19/2018 95  65 - 99 mg/dL Final   • BUN 04/19/2018 13  6 - 20 mg/dL Final   • Creatinine 04/19/2018 0.95  0.76 - 1.27 mg/dL Final   • Sodium 04/19/2018 136  136 - 145 mmol/L Final   • Potassium 04/19/2018 4.1  3.5 - 5.2 mmol/L Final   • Chloride 04/19/2018 100  98 - 107 mmol/L Final   • CO2 04/19/2018 25.7  22.0 - 29.0 mmol/L Final   • Calcium 04/19/2018 8.5 (A) 8.6 - 10.5 mg/dL Final   • Total Protein 04/19/2018 7.3  6.0 - 8.5 g/dL Final   • Albumin 04/19/2018 3.90  3.50 - 5.20 g/dL Final   • ALT (SGPT) 04/19/2018 14  1 - 41 U/L Final   • AST (SGOT) 04/19/2018 16  1 - 40 U/L Final   • Alkaline Phosphatase 04/19/2018 65  39 - 117 U/L Final   • Total Bilirubin 04/19/2018 0.4  0.1 - 1.2 mg/dL Final   • eGFR Non African Amer 04/19/2018 87  >60 mL/min/1.73 Final   • Globulin 04/19/2018 3.4  gm/dL Final   • A/G Ratio 04/19/2018 1.1  g/dL Final   • BUN/Creatinine Ratio 04/19/2018 13.7  7.0 - 25.0 Final   • Anion Gap 04/19/2018 10.3  mmol/L Final   • Strep A Ag 04/19/2018 Negative  Negative Final   • Monospot 04/19/2018 Negative  Negative Final   • WBC 04/19/2018 8.24  4.50 - 10.70 10*3/mm3 Final   • RBC 04/19/2018 5.07  4.60 - 6.00 10*6/mm3 Final   • Hemoglobin 04/19/2018 16.0  13.7 - 17.6 g/dL Final   • Hematocrit 04/19/2018 47.3  40.4 - 52.2 % Final   • MCV 04/19/2018 93.3  79.8 - 96.2 fL Final   • MCH 04/19/2018 31.6  27.0 - 32.7 pg Final   • MCHC 04/19/2018 33.8  32.6 - 36.4 g/dL Final   • RDW 04/19/2018 13.0  11.5 - 14.5 % Final   • RDW-SD  04/19/2018 44.0  37.0 - 54.0 fl Final   • MPV 04/19/2018 9.9  6.0 - 12.0 fL Final   • Platelets 04/19/2018 187  140 - 500 10*3/mm3 Final   • Neutrophil % 04/19/2018 54.7  42.7 - 76.0 % Final   • Lymphocyte % 04/19/2018 24.3  19.6 - 45.3 % Final   • Monocyte % 04/19/2018 17.7 (A) 5.0 - 12.0 % Final   • Eosinophil % 04/19/2018 1.5  0.3 - 6.2 % Final   • Basophil % 04/19/2018 1.3  0.0 - 1.5 % Final   • Immature Grans % 04/19/2018 0.5  0.0 - 0.5 % Final   • Neutrophils, Absolute 04/19/2018 4.51  1.90 - 8.10 10*3/mm3 Final   • Lymphocytes, Absolute 04/19/2018 2.00  0.90 - 4.80 10*3/mm3 Final   • Monocytes, Absolute 04/19/2018 1.46 (A) 0.20 - 1.20 10*3/mm3 Final   • Eosinophils, Absolute 04/19/2018 0.12  0.00 - 0.70 10*3/mm3 Final   • Basophils, Absolute 04/19/2018 0.11  0.00 - 0.20 10*3/mm3 Final   • Immature Grans, Absolute 04/19/2018 0.04 (A) 0.00 - 0.03 10*3/mm3 Final   • Throat Culture, Beta Strep 04/19/2018 Moderate growth (3+) Streptococcus, Beta Hemolytic, Not Group A (A)  Final    This organism is considered to be universally susceptible to penicillin.  No further antibiotic testing will be performed.  If Clindamycin or Erythromycin is the drug of choice, notify the laboratory within 7 days to request susceptibility testing.   • STREP GROUPING 04/19/2018 C   Final         Physical Exam  Vitals and nursing note reviewed.   Constitutional:       Appearance: Normal appearance. He is normal weight.   HENT:      Head: Normocephalic and atraumatic.   Cardiovascular:      Rate and Rhythm: Normal rate and regular rhythm.      Pulses: Normal pulses.      Heart sounds: Normal heart sounds. No murmur heard.    No friction rub. No gallop.   Pulmonary:      Effort: Pulmonary effort is normal. No respiratory distress.      Breath sounds: Normal breath sounds. No stridor. No wheezing, rhonchi or rales.   Chest:      Chest wall: No tenderness.   Abdominal:      General: Abdomen is flat. Bowel sounds are normal. There is no  distension.      Palpations: Abdomen is soft. There is no mass.      Tenderness: There is no abdominal tenderness. There is no right CVA tenderness, left CVA tenderness, guarding or rebound.      Hernia: No hernia is present.   Skin:     General: Skin is warm and dry.      Capillary Refill: Capillary refill takes less than 2 seconds.      Coloration: Skin is not jaundiced or pale.   Neurological:      General: No focal deficit present.      Mental Status: He is alert and oriented to person, place, and time. Mental status is at baseline.      Motor: No weakness.      Coordination: Coordination normal.      Gait: Gait normal.   Psychiatric:         Mood and Affect: Mood normal.         Behavior: Behavior normal.         Thought Content: Thought content normal.         Judgment: Judgment normal.          Result Review  Data Reviewed:{ Labs  Result Review  Imaging  Med Tab  Media :23}   I have reviewed this patient's chart.  I have reviewed previous labs, previous imaging, previous medications, and previous encounters with notes that were available in this patient's chart.           Assessment and Plan {CC Problem List  Visit Diagnosis  ROS  Review (Popup)  [facility]  Quality  BestPractice  Medications  SmartSets  SnapShot Encounters  Media :23}   Diagnoses and all orders for this visit:    1. Family history of stroke (cerebrovascular) (Primary)    2. Screening for diabetes mellitus  -     Hemoglobin A1c    3. Screening for endocrine, metabolic and immunity disorder  -     Comprehensive Metabolic Panel  -     CBC & Differential    4. Encounter for screening for lipid disorder  -     Lipid Panel    5. Screening for thyroid disorder  -     TSH    6. Bilateral leg cramps  -     Doppler Arterial Multi Level Lower Extremity - Bilateral CAR; Future      -Fasting labs today.  -Arterial Doppler of lower extremities ordered to rule out blockage of arterial blood flow.  Chose this test because patient's  pain only occurs when patient is walking or moving a significant amount that uses up oxygen lower extremities.  The pain goes away completely when patient is resting.  -If labs do not indicate dehydration, do a workup for vascular or neuro causes.   -Follow-up in 4 weeks or sooner if needed.  Discussed ER red flags.    I spent 40 minutes caring for Scott on this date of service. This time includes time spent by me in the following activities:preparing for the visit, reviewing tests, obtaining and/or reviewing a separately obtained history, performing a medically appropriate examination and/or evaluation , counseling and educating the patient/family/caregiver, ordering medications, tests, or procedures, referring and communicating with other health care professionals , documenting information in the medical record, independently interpreting results and communicating that information with the patient/family/caregiver and care coordination.    Follow Up {Instructions Charge Capture  Follow-up Communications :23}     Patient was given instructions and counseling regarding his condition or for health maintenance advice. Please see specific information pulled into the AVS (placed there by myself) if appropriate.    Return in about 4 weeks (around 10/24/2022) for Recheck leg cramps.    MDM  Number of Diagnoses or Management Options  Bilateral leg cramps: new, needed workup  Encounter for screening for lipid disorder: established, improving  Family history of stroke (cerebrovascular): established, improving  Screening for diabetes mellitus: established, improving  Screening for endocrine, metabolic and immunity disorder: established, improving  Screening for thyroid disorder: established, improving     Amount and/or Complexity of Data Reviewed  Clinical lab tests: ordered and reviewed  Tests in the radiology section of CPT®: ordered and reviewed  Tests in the medicine section of CPT®: reviewed  Discussion of test  results with the performing providers: no  Decide to obtain previous medical records or to obtain history from someone other than the patient: no  Obtain history from someone other than the patient: no  Review and summarize past medical records: yes  Discuss the patient with other providers: no  Independent visualization of images, tracings, or specimens: no    Risk of Complications, Morbidity, and/or Mortality  Presenting problems: high  Diagnostic procedures: low  Management options: high    Critical Care  Total time providing critical care: 30-74 minutes    Patient Progress  Patient progress: stable       SHEILA Hoffman, FNP-BC

## 2022-09-26 NOTE — PROGRESS NOTES
Venipuncture Blood Specimen Collection  Venipuncture performed in right  arm by Alecia Young MA with good hemostasis. Patient tolerated the procedure well without complications.   09/26/22   Alecia Young MA

## 2022-09-27 LAB
ALBUMIN SERPL-MCNC: 4.7 G/DL (ref 3.5–5.2)
ALBUMIN/GLOB SERPL: 1.6 G/DL
ALP SERPL-CCNC: 78 U/L (ref 39–117)
ALT SERPL W P-5'-P-CCNC: 14 U/L (ref 1–41)
ANION GAP SERPL CALCULATED.3IONS-SCNC: 9.9 MMOL/L (ref 5–15)
AST SERPL-CCNC: 12 U/L (ref 1–40)
BASOPHILS # BLD AUTO: 0.13 10*3/MM3 (ref 0–0.2)
BASOPHILS NFR BLD AUTO: 1.3 % (ref 0–1.5)
BILIRUB SERPL-MCNC: 0.7 MG/DL (ref 0–1.2)
BUN SERPL-MCNC: 12 MG/DL (ref 6–20)
BUN/CREAT SERPL: 12.9 (ref 7–25)
CALCIUM SPEC-SCNC: 9.6 MG/DL (ref 8.6–10.5)
CHLORIDE SERPL-SCNC: 99 MMOL/L (ref 98–107)
CHOLEST SERPL-MCNC: 186 MG/DL (ref 0–200)
CO2 SERPL-SCNC: 28.1 MMOL/L (ref 22–29)
CREAT SERPL-MCNC: 0.93 MG/DL (ref 0.76–1.27)
DEPRECATED RDW RBC AUTO: 38.3 FL (ref 37–54)
EGFRCR SERPLBLD CKD-EPI 2021: 101.9 ML/MIN/1.73
EOSINOPHIL # BLD AUTO: 0.12 10*3/MM3 (ref 0–0.4)
EOSINOPHIL NFR BLD AUTO: 1.2 % (ref 0.3–6.2)
ERYTHROCYTE [DISTWIDTH] IN BLOOD BY AUTOMATED COUNT: 12.3 % (ref 12.3–15.4)
GLOBULIN UR ELPH-MCNC: 3 GM/DL
GLUCOSE SERPL-MCNC: 103 MG/DL (ref 65–99)
HCT VFR BLD AUTO: 48.7 % (ref 37.5–51)
HDLC SERPL-MCNC: 40 MG/DL (ref 40–60)
HGB BLD-MCNC: 17.5 G/DL (ref 13–17.7)
IMM GRANULOCYTES # BLD AUTO: 0.07 10*3/MM3 (ref 0–0.05)
IMM GRANULOCYTES NFR BLD AUTO: 0.7 % (ref 0–0.5)
LDLC SERPL CALC-MCNC: 128 MG/DL (ref 0–100)
LDLC/HDLC SERPL: 3.16 {RATIO}
LYMPHOCYTES # BLD AUTO: 2.57 10*3/MM3 (ref 0.7–3.1)
LYMPHOCYTES NFR BLD AUTO: 25.9 % (ref 19.6–45.3)
MCH RBC QN AUTO: 30.8 PG (ref 26.6–33)
MCHC RBC AUTO-ENTMCNC: 35.9 G/DL (ref 31.5–35.7)
MCV RBC AUTO: 85.7 FL (ref 79–97)
MONOCYTES # BLD AUTO: 0.76 10*3/MM3 (ref 0.1–0.9)
MONOCYTES NFR BLD AUTO: 7.7 % (ref 5–12)
NEUTROPHILS NFR BLD AUTO: 6.27 10*3/MM3 (ref 1.7–7)
NEUTROPHILS NFR BLD AUTO: 63.2 % (ref 42.7–76)
NRBC BLD AUTO-RTO: 0 /100 WBC (ref 0–0.2)
PLATELET # BLD AUTO: 292 10*3/MM3 (ref 140–450)
PMV BLD AUTO: 10.5 FL (ref 6–12)
POTASSIUM SERPL-SCNC: 4.5 MMOL/L (ref 3.5–5.2)
PROT SERPL-MCNC: 7.7 G/DL (ref 6–8.5)
RBC # BLD AUTO: 5.68 10*6/MM3 (ref 4.14–5.8)
SODIUM SERPL-SCNC: 137 MMOL/L (ref 136–145)
TRIGL SERPL-MCNC: 99 MG/DL (ref 0–150)
TSH SERPL DL<=0.05 MIU/L-ACNC: 1.43 UIU/ML (ref 0.27–4.2)
VLDLC SERPL-MCNC: 18 MG/DL (ref 5–40)
WBC NRBC COR # BLD: 9.92 10*3/MM3 (ref 3.4–10.8)

## 2022-10-05 ENCOUNTER — HOSPITAL ENCOUNTER (OUTPATIENT)
Dept: CARDIOLOGY | Facility: HOSPITAL | Age: 47
Discharge: HOME | End: 2022-10-05
Admitting: REGISTERED NURSE

## 2022-10-05 DIAGNOSIS — R25.2 BILATERAL LEG CRAMPS: ICD-10-CM

## 2022-10-05 LAB
BH CV LOWER ARTERIAL LEFT ABI RATIO: 0.94
BH CV LOWER ARTERIAL LEFT CALF RATIO: 0.93
BH CV LOWER ARTERIAL LEFT DORSALIS PEDIS SYS MAX: 108
BH CV LOWER ARTERIAL LEFT GREAT TOE SYS MAX: 80
BH CV LOWER ARTERIAL LEFT LOW THIGH RATIO: 0.96
BH CV LOWER ARTERIAL LEFT LOW THIGH SYS MAX: 135
BH CV LOWER ARTERIAL LEFT POPLITEAL SYS MAX: 131
BH CV LOWER ARTERIAL LEFT POST TIBIAL SYS MAX: 133
BH CV LOWER ARTERIAL LEFT TBI RATIO: 0.57
BH CV LOWER ARTERIAL RIGHT ABI RATIO: 0.73
BH CV LOWER ARTERIAL RIGHT CALF RATIO: 0.72
BH CV LOWER ARTERIAL RIGHT DORSALIS PEDIS SYS MAX: 77
BH CV LOWER ARTERIAL RIGHT GREAT TOE SYS MAX: 27
BH CV LOWER ARTERIAL RIGHT LOW THIGH RATIO: 0.77
BH CV LOWER ARTERIAL RIGHT LOW THIGH SYS MAX: 108
BH CV LOWER ARTERIAL RIGHT POPLITEAL SYS MAX: 101
BH CV LOWER ARTERIAL RIGHT POST TIBIAL SYS MAX: 103
BH CV LOWER ARTERIAL RIGHT TBI RATIO: 0.19
MAXIMAL PREDICTED HEART RATE: 173 BPM
STRESS TARGET HR: 147 BPM
UPPER ARTERIAL LEFT ARM BRACHIAL SYS MAX: 141 MMHG
UPPER ARTERIAL RIGHT ARM BRACHIAL SYS MAX: 137 MMHG

## 2022-10-05 PROCEDURE — 93923 UPR/LXTR ART STDY 3+ LVLS: CPT

## 2022-10-10 ENCOUNTER — OFFICE VISIT (OUTPATIENT)
Dept: FAMILY MEDICINE CLINIC | Facility: CLINIC | Age: 47
End: 2022-10-10

## 2022-10-10 VITALS
RESPIRATION RATE: 18 BRPM | BODY MASS INDEX: 26.49 KG/M2 | WEIGHT: 213 LBS | HEIGHT: 75 IN | SYSTOLIC BLOOD PRESSURE: 130 MMHG | OXYGEN SATURATION: 97 % | HEART RATE: 73 BPM | TEMPERATURE: 100.2 F | DIASTOLIC BLOOD PRESSURE: 88 MMHG

## 2022-10-10 DIAGNOSIS — Z71.6 ENCOUNTER FOR SMOKING CESSATION COUNSELING: ICD-10-CM

## 2022-10-10 DIAGNOSIS — I99.8 ISCHEMIA OF RIGHT LOWER EXTREMITY: ICD-10-CM

## 2022-10-10 DIAGNOSIS — I99.8 ISCHEMIA OF LEFT LOWER EXTREMITY: Primary | ICD-10-CM

## 2022-10-10 PROCEDURE — 99214 OFFICE O/P EST MOD 30 MIN: CPT | Performed by: REGISTERED NURSE

## 2022-10-10 RX ORDER — VARENICLINE TARTRATE 1 MG/1
1 TABLET, FILM COATED ORAL 2 TIMES DAILY
Qty: 56 TABLET | Refills: 1 | Status: SHIPPED | OUTPATIENT
Start: 2022-11-07 | End: 2023-01-02

## 2022-11-21 ENCOUNTER — APPOINTMENT (OUTPATIENT)
Dept: VASCULAR SURGERY | Facility: HOSPITAL | Age: 47
End: 2022-11-21

## 2022-11-21 PROCEDURE — G0463 HOSPITAL OUTPT CLINIC VISIT: HCPCS

## 2022-11-21 RX ORDER — CILOSTAZOL 100 MG/1
100 TABLET ORAL 2 TIMES DAILY
Qty: 60 TABLET | Refills: 6 | Status: SHIPPED | OUTPATIENT
Start: 2022-11-21

## 2022-11-21 RX ORDER — ATORVASTATIN CALCIUM 40 MG/1
40 TABLET, FILM COATED ORAL DAILY
Qty: 30 TABLET | Refills: 11 | Status: SHIPPED | OUTPATIENT
Start: 2022-11-21 | End: 2023-11-21

## 2024-02-21 ENCOUNTER — HOSPITAL ENCOUNTER (EMERGENCY)
Facility: HOSPITAL | Age: 49
Discharge: HOME | End: 2024-02-21
Attending: EMERGENCY MEDICINE | Admitting: EMERGENCY MEDICINE
Payer: COMMERCIAL

## 2024-02-21 ENCOUNTER — APPOINTMENT (OUTPATIENT)
Dept: GENERAL RADIOLOGY | Facility: HOSPITAL | Age: 49
End: 2024-02-21
Payer: COMMERCIAL

## 2024-02-21 VITALS
DIASTOLIC BLOOD PRESSURE: 79 MMHG | RESPIRATION RATE: 16 BRPM | SYSTOLIC BLOOD PRESSURE: 125 MMHG | HEART RATE: 75 BPM | HEIGHT: 75 IN | TEMPERATURE: 95 F | WEIGHT: 231.48 LBS | OXYGEN SATURATION: 98 % | BODY MASS INDEX: 28.78 KG/M2
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
## 2024-02-21 DIAGNOSIS — M54.50 ACUTE RIGHT-SIDED LOW BACK PAIN WITHOUT SCIATICA: Primary | ICD-10-CM

## 2024-02-21 PROCEDURE — 99282 EMERGENCY DEPT VISIT SF MDM: CPT

## 2024-02-21 PROCEDURE — 72100 X-RAY EXAM L-S SPINE 2/3 VWS: CPT

## 2024-02-21 PROCEDURE — 96372 THER/PROPH/DIAG INJ SC/IM: CPT

## 2024-02-21 PROCEDURE — 25010000002 KETOROLAC TROMETHAMINE PER 15 MG: Performed by: EMERGENCY MEDICINE

## 2024-02-21 PROCEDURE — 25010000002 ORPHENADRINE CITRATE PER 60 MG: Performed by: EMERGENCY MEDICINE

## 2024-02-21 RX ORDER — KETOROLAC TROMETHAMINE 30 MG/ML
30 INJECTION, SOLUTION INTRAMUSCULAR; INTRAVENOUS ONCE
Status: COMPLETED | OUTPATIENT
Start: 2024-02-21 | End: 2024-02-21

## 2024-02-21 RX ORDER — ORPHENADRINE CITRATE 30 MG/ML
60 INJECTION INTRAMUSCULAR; INTRAVENOUS ONCE
Status: COMPLETED | OUTPATIENT
Start: 2024-02-21 | End: 2024-02-21

## 2024-02-21 RX ORDER — METHOCARBAMOL 750 MG/1
750 TABLET, FILM COATED ORAL 3 TIMES DAILY PRN
Qty: 15 TABLET | Refills: 0 | Status: SHIPPED | OUTPATIENT
Start: 2024-02-21

## 2024-02-21 RX ADMIN — ORPHENADRINE CITRATE 60 MG: 60 INJECTION INTRAMUSCULAR; INTRAVENOUS at 21:46

## 2024-02-21 RX ADMIN — KETOROLAC TROMETHAMINE 30 MG: 30 INJECTION, SOLUTION INTRAMUSCULAR; INTRAVENOUS at 21:46
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
